# Patient Record
(demographics unavailable — no encounter records)

---

## 2025-03-05 NOTE — ASSESSMENT
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 She has no recall of these nightly events. Will schedule PSG to evaluate for comorbid sleep disorders. Discussed need to avoid extra stressors, sleep deprivation and etoh intake. Follow up after PSG.

## 2025-03-05 NOTE — HISTORY OF PRESENT ILLNESS
[FreeTextEntry1] : 23yo with no med hx. Has sleep walking and sleep eating. Occurs at various parts of night. Does not always recall she does it but finds food contents in bed. Never eats non food items. Has had aggressive dream behavior as well. Did sleep talk as a child. Does not know if she snores. No extra stress, sleep deprivation or increased etoh intake. Feels unrefreshed in AM. [ESS] : 6

## 2025-04-15 NOTE — ASSESSMENT
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 PSG done. No RAKESH or PLMs, but did have some NREM parasomnia (out of N3 sleep). 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 Total sleep time is about 5 hours. Advised to increase sleep time to avoid sleep deprivation, avoid EtOH and reduce stress. Can consider benzos if needed but will defer at this time. Follow up as needed.

## 2025-04-15 NOTE — HISTORY OF PRESENT ILLNESS
[FreeTextEntry1] : 25yo with no med hx. Has sleep walking and sleep eating. Occurs at various parts of night. Does not always recall she does it but finds food contents in bed. Never eats non food items. Has had aggressive dream behavior as well. Did sleep talk as a child. Does not know if she snores. No extra stress, sleep deprivation or increased etoh intake. Feels unrefreshed in AM.  4/15/2025 had psg [ESS] : 5

## 2025-04-15 NOTE — REASON FOR VISIT
[Home] : at home, [unfilled] , at the time of the visit. [Medical Office: ([facility])___] : at the medical office located in  [Telehealth (audio & video)] : This visit was provided via telehealth using real-time 2-way audio visual technology. [Follow-Up] : a follow-up visit [FreeTextEntry2] : PSG follow up

## 2025-06-06 NOTE — ASSESSMENT
[FreeTextEntry1] : PFTs Mild obstruction, no BD response present, no restriction, mild reduction in diffusion capacity Coughing during the PFTs reported by the patient. says albuterol helped with improving coughing. FeNO normal   R maxillary sinus tenderness to palpation, sinusitis likely causing the post nasal drip and cough. Symptoms ongoing for weeks, will treat with antibiotics, start augmentin for 7 days Start Flonase Can use Albuterol prn for coughing fits and upper airway cough syndrome Start OTC antihistamines for cough prn Will obtain RVP, strep throat PCR, CBC/CMP Will obtain CXR Repeat PFTs once clinically improved in 6 weeks  FU 6 weeks or sooner if need be

## 2025-06-06 NOTE — HISTORY OF PRESENT ILLNESS
[Never] : never [Current] : current [TextBox_4] : 24y woman with recent COVID here for cough. COVID diagnosed 2 weeks ago, runny nose, cough, symptoms improved and then few days later worsened Coughing fits, mucus production + yellow, runny nose. Shortness of breath at rest yesterday.   Chest tightness yesterday, took inhaler from sister felt slightly better. No fever, no chills.  Post nasal drip+ No triggers at present No inhalers at present  Asthma as a child Was hospitalized and was in ICU for asthma as child.  Took multiple courses of steroids Grew out of it.   PMHx: Parasomnia Family Hx: grand parents has asthma, no lung ca

## 2025-06-06 NOTE — PHYSICAL EXAM
[No Acute Distress] : no acute distress [Normal Oropharynx] : normal oropharynx [Normal Appearance] : normal appearance [No Neck Mass] : no neck mass [Normal Rate/Rhythm] : normal rate/rhythm [Normal S1, S2] : normal s1, s2 [No Murmurs] : no murmurs [No Resp Distress] : no resp distress [Clear to Auscultation Bilaterally] : clear to auscultation bilaterally [No Abnormalities] : no abnormalities [Benign] : benign [Normal Gait] : normal gait [No Clubbing] : no clubbing [No Cyanosis] : no cyanosis [No Edema] : no edema [FROM] : FROM [Normal Color/ Pigmentation] : normal color/ pigmentation [No Focal Deficits] : no focal deficits [Oriented x3] : oriented x3 [Normal Affect] : normal affect [TextBox_44] : R maxillary sinus tenderness